# Patient Record
Sex: FEMALE | Race: WHITE | NOT HISPANIC OR LATINO | URBAN - METROPOLITAN AREA
[De-identification: names, ages, dates, MRNs, and addresses within clinical notes are randomized per-mention and may not be internally consistent; named-entity substitution may affect disease eponyms.]

---

## 2022-09-12 ENCOUNTER — EMERGENCY (EMERGENCY)
Facility: HOSPITAL | Age: 27
LOS: 1 days | Discharge: HOME | End: 2022-09-12
Attending: EMERGENCY MEDICINE | Admitting: EMERGENCY MEDICINE
Payer: COMMERCIAL

## 2022-09-12 VITALS
DIASTOLIC BLOOD PRESSURE: 89 MMHG | WEIGHT: 115.08 LBS | HEART RATE: 86 BPM | OXYGEN SATURATION: 97 % | SYSTOLIC BLOOD PRESSURE: 133 MMHG | HEIGHT: 62 IN | TEMPERATURE: 98 F | RESPIRATION RATE: 18 BRPM

## 2022-09-12 VITALS
RESPIRATION RATE: 18 BRPM | HEART RATE: 69 BPM | OXYGEN SATURATION: 100 % | DIASTOLIC BLOOD PRESSURE: 68 MMHG | TEMPERATURE: 98 F | SYSTOLIC BLOOD PRESSURE: 115 MMHG

## 2022-09-12 PROCEDURE — 93005 ELECTROCARDIOGRAM TRACING: CPT

## 2022-09-12 PROCEDURE — 99284 EMERGENCY DEPT VISIT MOD MDM: CPT

## 2022-09-12 PROCEDURE — 99285 EMERGENCY DEPT VISIT HI MDM: CPT

## 2022-09-12 PROCEDURE — 93010 ELECTROCARDIOGRAM REPORT: CPT

## 2022-09-12 RX ORDER — FAMOTIDINE 10 MG/ML
20 INJECTION INTRAVENOUS ONCE
Refills: 0 | Status: COMPLETED | OUTPATIENT
Start: 2022-09-12 | End: 2022-09-12

## 2022-09-12 RX ORDER — EPINEPHRINE 0.3 MG/.3ML
0.3 INJECTION INTRAMUSCULAR; SUBCUTANEOUS
Qty: 1 | Refills: 0
Start: 2022-09-12 | End: 2022-09-12

## 2022-09-12 RX ORDER — DEXAMETHASONE 0.5 MG/5ML
6 ELIXIR ORAL ONCE
Refills: 0 | Status: COMPLETED | OUTPATIENT
Start: 2022-09-12 | End: 2022-09-12

## 2022-09-12 RX ORDER — DIPHENHYDRAMINE HCL 50 MG
50 CAPSULE ORAL ONCE
Refills: 0 | Status: COMPLETED | OUTPATIENT
Start: 2022-09-12 | End: 2022-09-12

## 2022-09-12 RX ADMIN — Medication 6 MILLIGRAM(S): at 13:06

## 2022-09-12 RX ADMIN — Medication 50 MILLIGRAM(S): at 13:06

## 2022-09-12 RX ADMIN — FAMOTIDINE 20 MILLIGRAM(S): 10 INJECTION INTRAVENOUS at 13:05

## 2022-09-12 NOTE — ED ADULT TRIAGE NOTE - CHIEF COMPLAINT QUOTE
States "I got allergy shots 40 minutes ago for dogs, cats, and dust and when I was walking home I started to feel SOB and I broke out in hives so I used my epi pen". Denies SOB on arrival. Pt speaking in full complete sentences. Airway patent no

## 2022-09-12 NOTE — ED PROVIDER NOTE - OBJECTIVE STATEMENT
26F no PMH p/w concern for allergic rexn. Pt gets weekly allergy shots (pt hoping to get a cat). received her usual shots, was walking in the street ~15min later and began feeling diffuse itching, noticed hives all over her body, also began coughing and feeling SOB/throat tightness/CP - felt like she was suffocating. HAS never felt this way before. Called her allergist who recommended pt self administering her epi pen - pt injected epi pen to her L thigh and symptoms began to improve. Currently feeling much better - has minimal upper chest tightness and neck pruritis, all other symptoms resolved. Pt also notes that she was having dysuria, was diagnosed w/ UTI and started on Macrobid 2d ago, last dose was in the morning several hours prior to symptom onset. Urinary symptoms resolved. No other systemic symptoms.  Denies lightheaded, lip swelling, nausea, vomiting, diarrhea, abd pain, urinary complaints.

## 2022-09-12 NOTE — ED PROVIDER NOTE - PHYSICAL EXAMINATION
faint blanching urticaria/hives on L forearm and neck  No tonsillar hypertrophy, exudates, erythema. No obvious LAD. No trismus. No stridor/drooling, neck FROM. Normal sounding voice. Uvula midline. No facial swelling.   no LE edema, normal equal distal pulses, steady unassisted gait.

## 2022-09-12 NOTE — ED PROVIDER NOTE - PROGRESS NOTE DETAILS
[place]: Remains well appearing, will reassess. Klepfish: asymptomatic, observed >4hrs in ED, >5hrs from epi. Discussed importance of outpt follow up and return precautions. Clinically no indication for further emergent ED workup or hospitalization at this time. Stable for dc, outpt f/u.

## 2022-09-12 NOTE — ED PROVIDER NOTE - NSFOLLOWUPINSTRUCTIONS_ED_ALL_ED_FT
Can take benadryl 25-50mg every 6hrs as needed for rash/itching - MAY CAUSE LIGHTHEADEDNESS.    Can take tylenol 650mg every 6hrs as needed for pain.    Inject epi-pen as prescribed for worsening breathing AND/OR facial swelling or worsening voice changes. AND CALL 911 or RETURN to ER.    Follow up with primary doctor within 1-2 days.    Return to ER sooner for persistent fever/vomit, uncontrolled pain, worsening breathing, worsening lightheaded, worsening swelling.    Follow up with allergist.     Allergic Reaction    An allergic reaction is an abnormal reaction to a substance (allergen) by the body's defense system. Common allergens include medicines, food, insect bites or stings, and blood products. The body releases certain proteins into the blood that can cause a variety of symptoms such as an itchy rash, wheezing, swelling of the face/lips/tongue/throat, abdominal pain, nausea or vomiting. An allergic reaction is usually treated with medication. If your health care provider prescribed you an epinephrine injection device, make sure to keep it with you at all times.    SEEK IMMEDIATE MEDICAL CARE IF YOU HAVE ANY OF THE FOLLOWING SYMPTOMS: allergic reaction severe enough that required you to use epinephrine, tightness in your chest, swelling around your lips/tongue/throat, abdominal pain, vomiting or diarrhea, or lightheadedness/dizziness. These symptoms may represent a serious problem that is an emergency. Do not wait to see if the symptoms will go away. Use your auto-injector pen or anaphylaxis kit as you have been instructed. Call 911 and do not drive yourself to the hospital.     Anaphylaxis    An anaphylactic reaction (anaphylaxis) is a sudden, severe allergic reaction that affects multiple areas of the body. An allergic reaction is an abnormal reaction to a substance (allergen) by the body's defense system. Common allergens include medicines, food, insect bites or stings, and blood products. The body releases certain proteins into the blood that can cause a variety of symptoms such as an itchy rash, wheezing, swelling of the face/lips/tongue/throat, abdominal pain, nausea or vomiting. An allergic reaction is usually treated with medication. If your health care provider prescribed you an epinephrine injection device, make sure to keep it with you at all times.    SEEK IMMEDIATE MEDICAL CARE IF YOU HAVE ANY OF THE FOLLOWING SYMPTOMS: allergic reaction severe enough that required you to use epinephrine, tightness in your chest, swelling around your lips/tongue/throat, abdominal pain, vomiting or diarrhea, or lightheadedness/dizziness. These symptoms may represent a serious problem that is an emergency. Do not wait to see if the symptoms will go away. Use your auto-injector pen or anaphylaxis kit as you have been instructed. Call 911 and do not drive yourself to the hospital.

## 2022-09-12 NOTE — ED PROVIDER NOTE - PATIENT PORTAL LINK FT
You can access the FollowMyHealth Patient Portal offered by [facility] by registering at the following website: [email or web address]. By joining [facility]’s FollowMyHealth portal, you will also be able to view your health information using other applications (apps) compatible with our system.

## 2022-09-12 NOTE — ED ADULT NURSE REASSESSMENT NOTE - NS ED NURSE REASSESS COMMENT FT1
pt resting comfortably on stretcher, respirations equal and unlabored, denies any complaints at this time, continues to be on cardiac monitor.

## 2022-09-12 NOTE — ED PROVIDER NOTE - CLINICAL SUMMARY MEDICAL DECISION MAKING FREE TEXT BOX
26F no PMH p/w concern for allergic rexn. Pt gets weekly allergy shots (pt hoping to get a cat). received her usual shots, was walking in the street ~15min later and began feeling diffuse itching, noticed hives all over her body, also began coughing and feeling SOB/throat tightness/CP - felt like she was suffocating. HAS never felt this way before. Called her allergist who recommended pt self administering her epi pen - pt injected epi pen to her L thigh and symptoms began to improve. Currently feeling much better - has minimal upper chest tightness and neck pruritis, all other symptoms resolved. Pt also notes that she was having dysuria, was diagnosed w/ UTI and started on Macrobid 2d ago, last dose was in the morning several hours prior to symptom onset. Urinary symptoms resolved. No other systemic symptoms.  Vitals wnl, exam as above.  ddx: Allergic rexn w/ seemingly more than one organ system involvement including respiratory issues --> anaphylaxis. received epi and now much improved.  Will give other oral meds, observe in ED, reassess.

## 2022-09-12 NOTE — ED ADULT NURSE NOTE - OBJECTIVE STATEMENT
pt presents to ER after developing sob and hives after receiving allergy shots this AM. states she was walking home from her allergist appointment and suddenly became sob and developed hives all over her body. pt states she used her epi pen then came straight to ER. pt speaking in full sentences, respirations equal and unlabored, airway patent. pt states she feels better.

## 2022-09-12 NOTE — ED ADULT NURSE NOTE - CHIEF COMPLAINT QUOTE
States "I got allergy shots 40 minutes ago for dogs, cats, and dust and when I was walking home I started to feel SOB and I broke out in hives so I used my epi pen". Denies SOB on arrival. Pt speaking in full complete sentences. Airway patent

## 2022-09-12 NOTE — ED ADULT NURSE REASSESSMENT NOTE - NS ED NURSE REASSESS COMMENT FT1
pt resting comfortably on chair, respirations equal and unlabored, denies any complaints or difficulty breathing.

## 2022-09-15 DIAGNOSIS — T78.2XXA ANAPHYLACTIC SHOCK, UNSPECIFIED, INITIAL ENCOUNTER: ICD-10-CM
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
## 2022-09-15 DIAGNOSIS — Z91.09 OTHER ALLERGY STATUS, OTHER THAN TO DRUGS AND BIOLOGICAL SUBSTANCES: ICD-10-CM
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
## 2022-09-15 DIAGNOSIS — L29.9 PRURITUS, UNSPECIFIED: ICD-10-CM

## 2023-02-06 ENCOUNTER — EMERGENCY (EMERGENCY)
Facility: HOSPITAL | Age: 28
LOS: 1 days | Discharge: HOME | End: 2023-02-06
Attending: EMERGENCY MEDICINE | Admitting: EMERGENCY MEDICINE
Payer: COMMERCIAL

## 2023-02-06 VITALS
DIASTOLIC BLOOD PRESSURE: 77 MMHG | WEIGHT: 115.08 LBS | TEMPERATURE: 98 F | HEIGHT: 62 IN | RESPIRATION RATE: 20 BRPM | OXYGEN SATURATION: 98 % | SYSTOLIC BLOOD PRESSURE: 119 MMHG | HEART RATE: 84 BPM

## 2023-02-06 VITALS
RESPIRATION RATE: 18 BRPM | SYSTOLIC BLOOD PRESSURE: 98 MMHG | TEMPERATURE: 98 F | DIASTOLIC BLOOD PRESSURE: 65 MMHG | HEART RATE: 76 BPM | OXYGEN SATURATION: 96 %

## 2023-02-06 DIAGNOSIS — R06.02 SHORTNESS OF BREATH: ICD-10-CM

## 2023-02-06 DIAGNOSIS — Z91.048 OTHER NONMEDICINAL SUBSTANCE ALLERGY STATUS: ICD-10-CM
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
## 2023-02-06 DIAGNOSIS — R42 DIZZINESS AND GIDDINESS: ICD-10-CM
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
## 2023-02-06 DIAGNOSIS — T78.2XXA ANAPHYLACTIC SHOCK, UNSPECIFIED, INITIAL ENCOUNTER: ICD-10-CM

## 2023-02-06 PROCEDURE — 96372 THER/PROPH/DIAG INJ SC/IM: CPT

## 2023-02-06 PROCEDURE — 99285 EMERGENCY DEPT VISIT HI MDM: CPT

## 2023-02-06 PROCEDURE — 99284 EMERGENCY DEPT VISIT MOD MDM: CPT

## 2023-02-06 RX ORDER — FAMOTIDINE 10 MG/ML
20 INJECTION INTRAVENOUS ONCE
Refills: 0 | Status: COMPLETED | OUTPATIENT
Start: 2023-02-06 | End: 2023-02-06

## 2023-02-06 RX ORDER — DIPHENHYDRAMINE HCL 50 MG
50 CAPSULE ORAL ONCE
Refills: 0 | Status: COMPLETED | OUTPATIENT
Start: 2023-02-06 | End: 2023-02-06

## 2023-02-06 RX ORDER — DEXAMETHASONE 0.5 MG/5ML
10 ELIXIR ORAL ONCE
Refills: 0 | Status: DISCONTINUED | OUTPATIENT
Start: 2023-02-06 | End: 2023-02-06

## 2023-02-06 RX ORDER — DEXAMETHASONE 0.5 MG/5ML
6 ELIXIR ORAL ONCE
Refills: 0 | Status: COMPLETED | OUTPATIENT
Start: 2023-02-06 | End: 2023-02-06

## 2023-02-06 RX ADMIN — Medication 6 MILLIGRAM(S): at 13:34

## 2023-02-06 RX ADMIN — Medication 50 MILLIGRAM(S): at 13:32

## 2023-02-06 RX ADMIN — FAMOTIDINE 20 MILLIGRAM(S): 10 INJECTION INTRAVENOUS at 13:33

## 2023-02-06 NOTE — ED PROVIDER NOTE - OBJECTIVE STATEMENT
27F no PMH p/w concern for anaphylaxis. Received her usual weekly allergy shots today ~1230. Very shortly afterwards she began feeling nausea, lightheaded, wheezy, facial swelling, pruritic hives. Also felt like her throat was closing and felt like she was suffocating. Self injected epi pen. States she is now feeling much better and only current symptoms are mild sensation of throat tightness and mild lightheadedness.   Hx of anaphylaxis once prior, was more severe at that time.   Denies voice changes, lip swelling, vomiting, diarrhea, abd pain, urinary complaints.

## 2023-02-06 NOTE — ED PROVIDER NOTE - PHYSICAL EXAMINATION
No tonsillar hypertrophy, exudates, erythema. No obvious LAD. No trismus. No stridor/drooling, neck FROM. Normal sounding voice. Uvula midline. No facial swelling.   no LE edema, normal equal distal pulses, steady unassisted gait.

## 2023-02-06 NOTE — ED PROVIDER NOTE - PATIENT PORTAL LINK FT
You can access the FollowMyHealth Patient Portal offered by [facility] by registering at the following website: [email or web address]. By joining [facility]’s FollowMyHealth portal, you will also be able to view your health information using other applications (apps) compatible with our system. Drysol Counseling:  I discussed with the patient the risks of drysol/aluminum chloride including but not limited to skin rash, itching, irritation, burning.

## 2023-02-06 NOTE — ED PROVIDER NOTE - PROGRESS NOTE DETAILS
[place]lep[place]: pt now asymptomatic. Will continue to observe. Updated pt. [place]: remains asymptomatic, >5hrs from epi. states she has more epi pens at home and doesn't need another script. Discussed importance of outpt follow up and return precautions. Clinically no indication for further emergent ED workup or hospitalization at this time. Stable for dc, outpt f/u.

## 2023-02-06 NOTE — ED PROVIDER NOTE - NSFOLLOWUPINSTRUCTIONS_ED_ALL_ED_FT
Can take benadryl 25-50mg every 6hrs as needed for rash/itching - MAY CAUSE LIGHTHEADEDNESS/DROWSINESS.    Can take tylenol 650mg every 6hrs as needed for pain.    Take steroids as prescribed (next dose is tomorrow).     Inject epi-pen for worsening breathing AND/OR facial swelling or worsening voice changes. AND CALL 911 or RETURN to ER.    Follow up with primary doctor within 1-2 days.    Return to ER sooner for persistent fever/vomit, uncontrolled pain, worsening breathing, worsening lightheaded, worsening swelling.    Follow up with your allergist.     Allergic Reaction    An allergic reaction is an abnormal reaction to a substance (allergen) by the body's defense system. Common allergens include medicines, food, insect bites or stings, and blood products. The body releases certain proteins into the blood that can cause a variety of symptoms such as an itchy rash, wheezing, swelling of the face/lips/tongue/throat, abdominal pain, nausea or vomiting. An allergic reaction is usually treated with medication. If your health care provider prescribed you an epinephrine injection device, make sure to keep it with you at all times.    SEEK IMMEDIATE MEDICAL CARE IF YOU HAVE ANY OF THE FOLLOWING SYMPTOMS: allergic reaction severe enough that required you to use epinephrine, tightness in your chest, swelling around your lips/tongue/throat, abdominal pain, vomiting or diarrhea, or lightheadedness/dizziness. These symptoms may represent a serious problem that is an emergency. Do not wait to see if the symptoms will go away. Use your auto-injector pen or anaphylaxis kit as you have been instructed. Call 911 and do not drive yourself to the hospital.     Anaphylaxis    An anaphylactic reaction (anaphylaxis) is a sudden, severe allergic reaction that affects multiple areas of the body. An allergic reaction is an abnormal reaction to a substance (allergen) by the body's defense system. Common allergens include medicines, food, insect bites or stings, and blood products. The body releases certain proteins into the blood that can cause a variety of symptoms such as an itchy rash, wheezing, swelling of the face/lips/tongue/throat, abdominal pain, nausea or vomiting. An allergic reaction is usually treated with medication. If your health care provider prescribed you an epinephrine injection device, make sure to keep it with you at all times.    SEEK IMMEDIATE MEDICAL CARE IF YOU HAVE ANY OF THE FOLLOWING SYMPTOMS: allergic reaction severe enough that required you to use epinephrine, tightness in your chest, swelling around your lips/tongue/throat, abdominal pain, vomiting or diarrhea, or lightheadedness/dizziness. These symptoms may represent a serious problem that is an emergency. Do not wait to see if the symptoms will go away. Use your auto-injector pen or anaphylaxis kit as you have been instructed. Call 911 and do not drive yourself to the hospital.

## 2023-02-06 NOTE — ED PROVIDER NOTE - CLINICAL SUMMARY MEDICAL DECISION MAKING FREE TEXT BOX
27F no PMH p/w concern for anaphylaxis. Received her usual weekly allergy shots today ~1230. Very shortly afterwards she began feeling nausea, lightheaded, wheezy, facial swelling, pruritic hives. Also felt like her throat was closing and felt like she was suffocating. Self injected epi pen. States she is now feeling much better and only current symptoms are mild sensation of throat tightness and mild lightheadedness.   Hx of anaphylaxis once prior, was more severe at that time.   Vitals wnl, exam as above.  ddx: Likely anaphylaxis, now improved after epi.   Pepcid, benadryl, observe in ED/reassess.  d/w Dr. Escalante (her allergist), also requesting decadron and then 3d of 20mg prednisone.   Will reassess.

## 2023-02-06 NOTE — ED ADULT NURSE REASSESSMENT NOTE - NS ED NURSE REASSESS COMMENT FT1
Pt resting in stretcher in NAD. Denies throat itchiness or swelling at this time. No visible rashes or hives. Will monitor for 1 more hour. Pt aware of plan of care.

## 2023-02-06 NOTE — ED ADULT NURSE NOTE - OBJECTIVE STATEMENT
26 yo F presents to ED co allergic reaction, co chest tightness, lightheadedness, and faint hives to bilateral arms. Pt was at allergist to receive allergy shot, after leaving pt reports she started to feel like her airway was closing, and she was wheezing and self-administered an Epi Pen. Upon arrival to ED airway intact, chest rise equal and symmetrical, respirations unlabored, NAD. breath sounds clear bilaterally. Denies difficulty breathing, nausea, pruritis, CP. Pt reports similar response to allergy shot in September.

## 2023-05-16 NOTE — ED ADULT NURSE NOTE - NS ED TRIAGE CLINICAL UPGRADE
Attempted to reach patient but was unsuccessful. Left detailed voicemail including appointment date/ time, lab draw and clinic contact information. Encouraged patient to call back with any questions or concerns.   Deteriorating patient status - Patient was clinically upgraded due to deteriorating patient status.